# Patient Record
Sex: MALE | Race: WHITE | Employment: OTHER | URBAN - METROPOLITAN AREA
[De-identification: names, ages, dates, MRNs, and addresses within clinical notes are randomized per-mention and may not be internally consistent; named-entity substitution may affect disease eponyms.]

---

## 2017-01-06 ENCOUNTER — OFFICE VISIT (OUTPATIENT)
Dept: ORTHOPEDICS CLINIC | Facility: CLINIC | Age: 67
End: 2017-01-06

## 2017-01-06 ENCOUNTER — HOSPITAL ENCOUNTER (OUTPATIENT)
Dept: GENERAL RADIOLOGY | Facility: HOSPITAL | Age: 67
Discharge: HOME | End: 2017-01-06
Attending: ORTHOPAEDIC SURGERY | Admitting: ORTHOPAEDIC SURGERY
Payer: MEDICARE

## 2017-01-06 DIAGNOSIS — M25.521 RIGHT ELBOW PAIN: ICD-10-CM

## 2017-01-06 DIAGNOSIS — M77.01 MEDIAL EPICONDYLITIS, RIGHT: Primary | ICD-10-CM

## 2017-01-06 PROCEDURE — 99203 OFFICE O/P NEW LOW 30 MIN: CPT | Performed by: ORTHOPAEDIC SURGERY

## 2017-01-06 PROCEDURE — G0463 HOSPITAL OUTPT CLINIC VISIT: HCPCS | Performed by: ORTHOPAEDIC SURGERY

## 2017-01-06 PROCEDURE — 73080 X-RAY EXAM OF ELBOW: CPT

## 2017-01-06 NOTE — PROGRESS NOTES
1/6/2017  Coby Austin  19/1950  74year old   male  Lilliana Masters MD    HPI:   Patient presents with:  Consult: Right elbow and forearm pain -- Onset 09/2017 from pulling some wires while doing electrical. Pt plays tennis.   Denies ibeth Past Surgical History    COLONOSCOPY  2011    TONSILLECTOMY        Family History   Problem Relation Age of Onset   • Cancer Mother 80     abdominal cancer   • Heart Disorder Mother    • Substance Abuse Son         Smoking Status: Never Smoker repeat evaluation. Patient was told to let pain be his guide in terms of performing activities in the future.   The patient was told that if he continues to have significant pain after the next visit, he would likely benefit from a lidocaine injection into

## 2017-01-13 ENCOUNTER — OFFICE VISIT (OUTPATIENT)
Dept: OCCUPATIONAL MEDICINE | Facility: HOSPITAL | Age: 67
End: 2017-01-13
Attending: ORTHOPAEDIC SURGERY
Payer: MEDICARE

## 2017-01-13 DIAGNOSIS — M77.01 MEDIAL EPICONDYLITIS, RIGHT: Primary | ICD-10-CM

## 2017-01-13 PROCEDURE — 97530 THERAPEUTIC ACTIVITIES: CPT | Performed by: OCCUPATIONAL THERAPIST

## 2017-01-13 PROCEDURE — 97165 OT EVAL LOW COMPLEX 30 MIN: CPT | Performed by: OCCUPATIONAL THERAPIST

## 2017-01-13 NOTE — PROGRESS NOTES
OCCUPATIONAL THERAPY UPPER EXTREMITY EVALUATION:   Referring Physician: Dr. Brea Francis  Date of onset: August,2016  Right medial epicondyltiits Date of Service: 1/13/2017       PATIENT SUMMARY:   Elva Eastman is a 74year old y/o male who presents medial epicondyle and improve strength for IADL's. Precautions: None        OBJECTIVE:   OBSERVATION: Patient was seen for initial evaluation, moist heat, AROM, STM and HEP instruction. See below for objective measurements.     ORTHOTICS: forearm counter None  Rehab Potential:excellent     FOTO: 69/100  Current status G Code: Initial Carrying, Moving and Handling Objects CJ: 20-39% impaired, limited, or restricted  Goal status G Code: Carrying, Moving and Handling Objects CI: 1%-19% impaired, limited, or r

## 2017-01-16 ENCOUNTER — OFFICE VISIT (OUTPATIENT)
Dept: OCCUPATIONAL MEDICINE | Facility: HOSPITAL | Age: 67
End: 2017-01-16
Attending: ORTHOPAEDIC SURGERY
Payer: MEDICARE

## 2017-01-16 DIAGNOSIS — M77.01 MEDIAL EPICONDYLITIS, RIGHT: Primary | ICD-10-CM

## 2017-01-16 PROCEDURE — 97140 MANUAL THERAPY 1/> REGIONS: CPT | Performed by: OCCUPATIONAL THERAPIST

## 2017-01-16 PROCEDURE — 97110 THERAPEUTIC EXERCISES: CPT | Performed by: OCCUPATIONAL THERAPIST

## 2017-01-16 PROCEDURE — 97035 APP MDLTY 1+ULTRASOUND EA 15: CPT | Performed by: OCCUPATIONAL THERAPIST

## 2017-01-16 NOTE — PROGRESS NOTES
OAuthorized # of Visits:  8        Next MD visit: none scheduled  Fall Risk: standard         Precautions: n/a           Medication Changes since last visit?: No  Subjective: \"My arm is feeling better, the real test will be when I play tennis. \"    Ob

## 2017-01-20 ENCOUNTER — OFFICE VISIT (OUTPATIENT)
Dept: OCCUPATIONAL MEDICINE | Facility: HOSPITAL | Age: 67
End: 2017-01-20
Attending: ORTHOPAEDIC SURGERY
Payer: MEDICARE

## 2017-01-20 DIAGNOSIS — M77.01 MEDIAL EPICONDYLITIS, RIGHT: Primary | ICD-10-CM

## 2017-01-20 PROCEDURE — 97110 THERAPEUTIC EXERCISES: CPT | Performed by: OCCUPATIONAL THERAPIST

## 2017-01-20 PROCEDURE — 97140 MANUAL THERAPY 1/> REGIONS: CPT | Performed by: OCCUPATIONAL THERAPIST

## 2017-01-20 NOTE — PROGRESS NOTES
OAuthorized # of Visits:  8        Next MD visit: none scheduled  Fall Risk: standard         Precautions: n/a           Medication Changes since last visit?: No  Subjective:  Patient reports pain in right medial epicondyle after second day of tennis. 50 min

## 2017-01-23 ENCOUNTER — OFFICE VISIT (OUTPATIENT)
Dept: OCCUPATIONAL MEDICINE | Facility: HOSPITAL | Age: 67
End: 2017-01-23
Attending: ORTHOPAEDIC SURGERY
Payer: MEDICARE

## 2017-01-23 DIAGNOSIS — M77.01 MEDIAL EPICONDYLITIS, RIGHT: Primary | ICD-10-CM

## 2017-01-23 PROCEDURE — 97110 THERAPEUTIC EXERCISES: CPT | Performed by: OCCUPATIONAL THERAPIST

## 2017-01-23 PROCEDURE — 97140 MANUAL THERAPY 1/> REGIONS: CPT | Performed by: OCCUPATIONAL THERAPIST

## 2017-01-23 NOTE — PROGRESS NOTES
OAuthorized # of Visits:  8        Next MD visit: none scheduled  Fall Risk: standard         Precautions: n/a           Medication Changes since last visit?: No  Subjective:  Patient reports taking less medication (ibuprofin) past few days with same d maintain progress achieved in OT. Patient will demonstrate improved stress right  strength to 80 lbs for ease in opening jars. Patient will demonstrate test negative for provocative test: resisted forearm pronation.       Plan: Continue to work toward

## 2017-01-25 ENCOUNTER — OFFICE VISIT (OUTPATIENT)
Dept: OCCUPATIONAL MEDICINE | Facility: HOSPITAL | Age: 67
End: 2017-01-25
Attending: ORTHOPAEDIC SURGERY
Payer: MEDICARE

## 2017-01-25 DIAGNOSIS — M77.01 MEDIAL EPICONDYLITIS, RIGHT: Primary | ICD-10-CM

## 2017-01-25 PROCEDURE — 97140 MANUAL THERAPY 1/> REGIONS: CPT | Performed by: OCCUPATIONAL THERAPIST

## 2017-01-25 PROCEDURE — 97110 THERAPEUTIC EXERCISES: CPT | Performed by: OCCUPATIONAL THERAPIST

## 2017-01-25 NOTE — PROGRESS NOTES
OAuthorized # of Visits:  8        Next MD visit: none scheduled  Fall Risk: standard         Precautions: n/a           Medication Changes since last visit?: No  Subjective:  \" I played tennis yesterday and after about 1.5 hrs the pain became 3-4/10, shoulder flex/extension exercises         Body blade, UE resistive ex, with elbow flex and then with elbow extended 1 min each          Assessment: Upgraded resistive exercises today with minimal discomfort reported.        Goals:    Pt will present with im

## 2017-01-30 ENCOUNTER — OFFICE VISIT (OUTPATIENT)
Dept: OCCUPATIONAL MEDICINE | Facility: HOSPITAL | Age: 67
End: 2017-01-30
Attending: ORTHOPAEDIC SURGERY
Payer: MEDICARE

## 2017-01-30 DIAGNOSIS — M77.01 MEDIAL EPICONDYLITIS, RIGHT: Primary | ICD-10-CM

## 2017-01-30 PROCEDURE — 97140 MANUAL THERAPY 1/> REGIONS: CPT | Performed by: OCCUPATIONAL THERAPIST

## 2017-01-30 PROCEDURE — 97110 THERAPEUTIC EXERCISES: CPT | Performed by: OCCUPATIONAL THERAPIST

## 2017-01-30 NOTE — PROGRESS NOTES
OAuthorized # of Visits:  8        Next MD visit: none scheduled  Fall Risk: standard         Precautions: n/a           Medication Changes since last visit?: No  Subjective:  \" I played tennis last week and the pain came back after about 1 hour of pl pulsed ultrs 3.0 MHZ ,1.2 cm2, 8 min  Power  55#, 50 pegs Power  75#, 50 pegs Closed chain exercise, rebounder with 2# med ball Closed chain exercise, rebounder with 4# med ball x 40       Red T band PRE wrist flex/ext, forearm supine, shoulder fle

## 2017-02-01 ENCOUNTER — OFFICE VISIT (OUTPATIENT)
Dept: OCCUPATIONAL MEDICINE | Facility: HOSPITAL | Age: 67
End: 2017-02-01
Attending: ORTHOPAEDIC SURGERY
Payer: MEDICARE

## 2017-02-01 DIAGNOSIS — M77.01 MEDIAL EPICONDYLITIS, RIGHT: Primary | ICD-10-CM

## 2017-02-01 PROCEDURE — 97110 THERAPEUTIC EXERCISES: CPT | Performed by: OCCUPATIONAL THERAPIST

## 2017-02-01 PROCEDURE — 97140 MANUAL THERAPY 1/> REGIONS: CPT | Performed by: OCCUPATIONAL THERAPIST

## 2017-02-01 NOTE — PROGRESS NOTES
OAuthorized # of Visits:  12 (per MD order)     Next MD visit: none scheduled  Fall Risk: standard         Precautions: n/a           Medication Changes since last visit?: No  Subjective:  \" The pain in my right arm increased more quickly while playin Green putty, , twist and pull, pinch, 5 min Green putty, , twist and pull, pinch, 5 min Blue putty, FDS/ FDP , twist and pull Blue putty, FDS/ FDP , twist and pull Blue putty, FDS/ FDP , twist and pull    20% pulsed ultrs 3.0 MHZ ,1.

## 2017-02-06 ENCOUNTER — OFFICE VISIT (OUTPATIENT)
Dept: OCCUPATIONAL MEDICINE | Facility: HOSPITAL | Age: 67
End: 2017-02-06
Attending: ORTHOPAEDIC SURGERY
Payer: MEDICARE

## 2017-02-06 DIAGNOSIS — M77.01 MEDIAL EPICONDYLITIS, RIGHT: Primary | ICD-10-CM

## 2017-02-06 PROCEDURE — 97140 MANUAL THERAPY 1/> REGIONS: CPT | Performed by: OCCUPATIONAL THERAPIST

## 2017-02-06 PROCEDURE — 97110 THERAPEUTIC EXERCISES: CPT | Performed by: OCCUPATIONAL THERAPIST

## 2017-02-06 NOTE — PROGRESS NOTES
OAuthorized # of Visits:  12 (per MD order)     Next MD visit: none scheduled  Fall Risk: standard         Precautions: n/a           Medication Changes since last visit?: No  Subjective:  \"I've been having a little pain, especially when I need to twi with 4 # , x 20,        PRE wrist flex/ext with 3 wts x 20, 2 sets PRE wrist flex/ext with 3 # x 20, 2 sets PRE wrist flex/ext with 3 # x 25, 2 sets PRE wrist flex/ext, UD/RD with 5# x15, 2 sets PRE wrist flex/ext, UD/RD with 5# x15, 2 sets PRE wrist flex/ afshan.  Patient will demonstrate test negative for provocative test: resisted forearm pronation. ...(made some progress, less painful)      Plan: Continue to work toward pain management and increasing strength in right UE.  Reevaluate next session  Charges: SHANIQUA

## 2017-02-10 ENCOUNTER — OFFICE VISIT (OUTPATIENT)
Dept: OCCUPATIONAL MEDICINE | Facility: HOSPITAL | Age: 67
End: 2017-02-10
Attending: ORTHOPAEDIC SURGERY
Payer: MEDICARE

## 2017-02-10 PROCEDURE — 97035 APP MDLTY 1+ULTRASOUND EA 15: CPT | Performed by: OCCUPATIONAL THERAPIST

## 2017-02-10 PROCEDURE — 97110 THERAPEUTIC EXERCISES: CPT | Performed by: OCCUPATIONAL THERAPIST

## 2017-02-10 PROCEDURE — 97140 MANUAL THERAPY 1/> REGIONS: CPT | Performed by: OCCUPATIONAL THERAPIST

## 2017-02-10 NOTE — PROGRESS NOTES
OAuthorized # of Visits:  12 (per MD order)     Next MD visit: none scheduled  Fall Risk: standard         Precautions: n/a           Medication Changes since last visit?: No  Subjective:  \" The elbow pain increased after two days of tennis, 4-5/10 pa pronation/supination with 4 # , x 20, Eccentric forearm pronation with 3 wts x 20       PRE wrist flex/ext with 3 wts x 20, 2 sets PRE wrist flex/ext with 3 # x 20, 2 sets PRE wrist flex/ext with 3 # x 25, 2 sets PRE wrist flex/ext, UD/RD with 5# x15, 2 se Severity modifier to: CI  Pt complaints of pain in right medial epicondyle  will decrease at worst to 1/10 when serving in tennis. ...(made progress toward)  Pt will be independent and compliant with comprehensive HEP to maintain progress achieved in OT. Slater August Slater August Slater August

## 2017-02-15 ENCOUNTER — APPOINTMENT (OUTPATIENT)
Dept: OCCUPATIONAL MEDICINE | Facility: HOSPITAL | Age: 67
End: 2017-02-15
Attending: ORTHOPAEDIC SURGERY
Payer: MEDICARE

## 2017-02-17 ENCOUNTER — OFFICE VISIT (OUTPATIENT)
Dept: OCCUPATIONAL MEDICINE | Facility: HOSPITAL | Age: 67
End: 2017-02-17
Attending: ORTHOPAEDIC SURGERY
Payer: MEDICARE

## 2017-02-17 PROCEDURE — 97110 THERAPEUTIC EXERCISES: CPT | Performed by: OCCUPATIONAL THERAPIST

## 2017-02-17 PROCEDURE — 97140 MANUAL THERAPY 1/> REGIONS: CPT | Performed by: OCCUPATIONAL THERAPIST

## 2017-02-17 NOTE — PROGRESS NOTES
OAuthorized # of Visits:  12 (per MD order)     Next MD visit: none scheduled  Fall Risk: standard         Precautions: n/a           Medication Changes since last visit?: No  Subjective:  \" The pain persists on and off even though I haven't been play 25 PRE forearm pronation/supination with 3 # wt 25 PRE forearm pronation/supination with 3 # , x 20, 2 sets Eccentric forearm pronation exercises with 3 wts  PRE forearm pronation/supination with 4 # , x 20, Eccentric forearm pronation with 3 wts x 20 Ecce Body blade, UE resistive ex, with elbow flex and then with elbow extended 1 min each Power  75#, 50 pegs Power  100# 50 pegs Power  100# 50 pegs 20% pulsed ultrs 3.0 MHZ ,1.2 cm2, 8 min Cont ultrasound 3.0 MHZ, 1.2 w/cm2 8 min at [facility]

## 2017-03-01 ENCOUNTER — OFFICE VISIT (OUTPATIENT)
Dept: OCCUPATIONAL MEDICINE | Facility: HOSPITAL | Age: 67
End: 2017-03-01
Attending: ORTHOPAEDIC SURGERY
Payer: MEDICARE

## 2017-03-01 PROCEDURE — 97140 MANUAL THERAPY 1/> REGIONS: CPT | Performed by: OCCUPATIONAL THERAPIST

## 2017-03-01 PROCEDURE — 97110 THERAPEUTIC EXERCISES: CPT | Performed by: OCCUPATIONAL THERAPIST

## 2017-03-01 NOTE — PROGRESS NOTES
OAuthorized # of Visits:  12 (per MD order)     Next MD visit: none scheduled  Fall Risk: standard         Precautions: n/a           Medication Changes since last visit?: No  Subjective:  \"I took off from tennis for 2 weeks then just played yesterday PRE forearm pronation/supination with 2 # wt 20,  2sets PRE forearm pronation/supination with 3 # wt 25 PRE forearm pronation/supination with 3 # wt 25 PRE forearm pronation/supination with 3 # , x 20, 2 sets Eccentric forearm pronation exercises with 3 wt Green T band shoulder flex/extension exercises x20, 2 sets Green T band shoulder flex/extension exercises x20, 2 sets Green T band shoulder flex/extension exercises x20, 2 Green T band shoulder flex/extension exercises x20, 2 Green T band shoulder flex/ext

## 2017-03-02 ENCOUNTER — APPOINTMENT (OUTPATIENT)
Dept: CT IMAGING | Facility: HOSPITAL | Age: 67
End: 2017-03-02
Attending: NURSE PRACTITIONER
Payer: MEDICARE

## 2017-03-02 ENCOUNTER — HOSPITAL ENCOUNTER (EMERGENCY)
Facility: HOSPITAL | Age: 67
Discharge: HOME | End: 2017-03-02
Payer: MEDICARE

## 2017-03-02 VITALS
BODY MASS INDEX: 31.84 KG/M2 | HEART RATE: 78 BPM | OXYGEN SATURATION: 99 % | DIASTOLIC BLOOD PRESSURE: 79 MMHG | TEMPERATURE: 98 F | WEIGHT: 215 LBS | RESPIRATION RATE: 14 BRPM | SYSTOLIC BLOOD PRESSURE: 152 MMHG | HEIGHT: 69 IN

## 2017-03-02 DIAGNOSIS — S01.01XA SCALP LACERATION, INITIAL ENCOUNTER: Primary | ICD-10-CM

## 2017-03-02 DIAGNOSIS — S06.0X0A MILD CONCUSSION, WITHOUT LOSS OF CONSCIOUSNESS, INITIAL ENCOUNTER: ICD-10-CM

## 2017-03-02 PROCEDURE — 12001 RPR S/N/AX/GEN/TRNK 2.5CM/<: CPT

## 2017-03-02 PROCEDURE — 99284 EMERGENCY DEPT VISIT MOD MDM: CPT

## 2017-03-02 PROCEDURE — 70450 CT HEAD/BRAIN W/O DYE: CPT

## 2017-03-02 RX ORDER — TRAMADOL HYDROCHLORIDE 50 MG/1
50 TABLET ORAL EVERY 6 HOURS PRN
Qty: 15 TABLET | Refills: 0 | Status: SHIPPED | OUTPATIENT
Start: 2017-03-02 | End: 2017-03-06

## 2017-03-02 NOTE — ED INITIAL ASSESSMENT (HPI)
Pt states he fell backwards hitting his head on the ground while playing tennis about 2 hours PTA. Pt denies loss of consciousness, nausea, blurring of vision or weakness. Pt is not on blood-thinners. He sustained a laceration at the back of his head.

## 2017-03-03 NOTE — ED NOTES
Patient discharged home in no acute distress. A&Ox4, skin p/w/d, denies cp/sob. Ambulating with steady gait and verbalized understanding of d/c instructions and prescriptions. Dry dressing applied to cover wound prior to d/c.

## 2017-03-03 NOTE — ED PROVIDER NOTES
Patient Seen in: [facility] Emergency Department    History   CC: head injury  HPI: Alis Sandhu 74year old male  who presents to the ER c/o head injury today approximately 2 hours prior to coming to the emergency department.   States he discontinued   LORATADINE ALLERGY RELIEF OR,  Take  by mouth. Multiple Vitamin (MULTI-VITAMINS) Oral Tab,  Take 1 tablet by mouth daily. Ginkgo Biloba 100 MG Oral Cap,  Take 1 capsule by mouth daily.    predniSONE 10 MG Oral Tab,  4 a day for 3 days 3 a finger to nose tests,  MSK - makes purposeful movements of all extremities, hand strength equal bilat, radial pulses 2+ bilat.   Psych - Interactive and appropriate      ED Course   Labs Reviewed - No data to display    MDM       Procedure: LACERATION REPAI

## 2017-03-03 NOTE — ED NOTES
This RN called to bedside by ED registrar stating patient \"feeling faint\". Patient sitting up on cart, states he was feeling slightly dizzy and like he may have a syncopal episode. Patient lied down flat on cart.  Lights turned down at his request. NP [facility]

## 2017-03-08 ENCOUNTER — OFFICE VISIT (OUTPATIENT)
Dept: OCCUPATIONAL MEDICINE | Facility: HOSPITAL | Age: 67
End: 2017-03-08
Attending: ORTHOPAEDIC SURGERY
Payer: MEDICARE

## 2017-03-08 PROCEDURE — 97140 MANUAL THERAPY 1/> REGIONS: CPT | Performed by: OCCUPATIONAL THERAPIST

## 2017-03-08 PROCEDURE — 97110 THERAPEUTIC EXERCISES: CPT | Performed by: OCCUPATIONAL THERAPIST

## 2017-03-08 NOTE — PROGRESS NOTES
Patient Name: Luisito Chakraborty, : 3/9/1950, MRN: [id number]   Date:  3/8/2017  Referring Physician:  Senthil Giles    Diagnosis: right medial epicondylitis      Discharge Summary OT    Pt has attended 12   , cancelled 1, and no shown 0 visi Severity modifier to: HANNAH Gamble .(Achieved)  Pt complaints of pain in right medial epicondyle  will decrease at worst to 1/10 when serving in tennis. ...(made progress toward)  Pt will be independent and compliant with comprehensive HEP to maintain progress achie

## 2017-03-10 ENCOUNTER — OFFICE VISIT (OUTPATIENT)
Dept: INTERNAL MEDICINE CLINIC | Facility: CLINIC | Age: 67
End: 2017-03-10

## 2017-03-10 VITALS
DIASTOLIC BLOOD PRESSURE: 85 MMHG | HEIGHT: 69 IN | SYSTOLIC BLOOD PRESSURE: 138 MMHG | BODY MASS INDEX: 32.88 KG/M2 | HEART RATE: 72 BPM | WEIGHT: 222 LBS | TEMPERATURE: 99 F

## 2017-03-10 DIAGNOSIS — Z48.02 VISIT FOR SUTURE REMOVAL: Primary | ICD-10-CM

## 2017-03-10 PROCEDURE — G0463 HOSPITAL OUTPT CLINIC VISIT: HCPCS | Performed by: INTERNAL MEDICINE

## 2017-03-10 PROCEDURE — 99213 OFFICE O/P EST LOW 20 MIN: CPT | Performed by: INTERNAL MEDICINE

## 2017-03-10 NOTE — PROGRESS NOTES
7 sutures removed from scalp pt salvador well  Blood pressure 138/85, pulse 72, temperature 98.5 °F (36.9 °C), temperature source Oral, height 5' 9\" (1.753 m), weight 222 lb (100.699 kg).

## 2018-06-30 ENCOUNTER — TELEPHONE (OUTPATIENT)
Dept: INTERNAL MEDICINE CLINIC | Facility: CLINIC | Age: 68
End: 2018-06-30

## 2018-06-30 NOTE — TELEPHONE ENCOUNTER
Pt scheduled appt via ERPLY with the following message:  7/3/2018     8:50 AM  20 mins. Mary Ann Gonzalez MD        [facility]      Patient Comments:   New Problem / [address] Visit   For last week plus, I've had soreness/swelling in my    right foot.

## 2018-06-30 NOTE — TELEPHONE ENCOUNTER
Patient calling - verified patient's name and  - pt denies injury, discoloration, tingling, bruising, chest pain SOB, dizziness. Pt states the swelling is \"moderate\" and he can still wear his shoe.  Pt states he feels that the pain and swelling are imp

## 2018-07-03 ENCOUNTER — OFFICE VISIT (OUTPATIENT)
Dept: INTERNAL MEDICINE CLINIC | Facility: CLINIC | Age: 68
End: 2018-07-03

## 2018-07-03 VITALS
BODY MASS INDEX: 32.13 KG/M2 | DIASTOLIC BLOOD PRESSURE: 82 MMHG | HEART RATE: 83 BPM | WEIGHT: 212 LBS | HEIGHT: 68 IN | SYSTOLIC BLOOD PRESSURE: 146 MMHG

## 2018-07-03 DIAGNOSIS — R03.0 ELEVATED BLOOD PRESSURE READING: ICD-10-CM

## 2018-07-03 DIAGNOSIS — M79.671 RIGHT FOOT PAIN: Primary | ICD-10-CM

## 2018-07-03 PROCEDURE — G0463 HOSPITAL OUTPT CLINIC VISIT: HCPCS | Performed by: INTERNAL MEDICINE

## 2018-07-03 PROCEDURE — 99213 OFFICE O/P EST LOW 20 MIN: CPT | Performed by: INTERNAL MEDICINE

## 2018-07-03 NOTE — PATIENT INSTRUCTIONS
Please call if your right foot pain does not continue to go away. You may stop ibuprofen soon. Please limit dietary sodium and increase dietary potassium. Return visit in 1 month for a blood pressure check.

## 2018-07-03 NOTE — PROGRESS NOTES
Mikael Cordova is a 77year old male. Patient presents with:  Swelling: C/o right foot swelling, and toe soreness.   Stts he's taken 800 mg ibuprofen twice a day with improvement    HPI:   About 2 weeks ago, he developed swelling and \"soreness\" on Depression with anxiety 1990s   • Seasonal allergies      Past Surgical History:  2011: COLONOSCOPY  Childhood: TONSILLECTOMY   Social History:  Smoking status: Never Smoker                                                              Smokeless tobacco: Ne

## 2018-07-30 ENCOUNTER — OFFICE VISIT (OUTPATIENT)
Dept: INTERNAL MEDICINE CLINIC | Facility: CLINIC | Age: 68
End: 2018-07-30
Payer: MEDICARE

## 2018-07-30 VITALS
BODY MASS INDEX: 32.43 KG/M2 | DIASTOLIC BLOOD PRESSURE: 64 MMHG | SYSTOLIC BLOOD PRESSURE: 140 MMHG | RESPIRATION RATE: 18 BRPM | WEIGHT: 214 LBS | HEART RATE: 78 BPM | HEIGHT: 68 IN

## 2018-07-30 DIAGNOSIS — R03.0 ELEVATED BLOOD PRESSURE READING: ICD-10-CM

## 2018-07-30 DIAGNOSIS — M79.672 LEFT FOOT PAIN: Primary | ICD-10-CM

## 2018-07-30 PROCEDURE — 99213 OFFICE O/P EST LOW 20 MIN: CPT | Performed by: INTERNAL MEDICINE

## 2018-07-30 PROCEDURE — G0463 HOSPITAL OUTPT CLINIC VISIT: HCPCS | Performed by: INTERNAL MEDICINE

## 2018-07-30 NOTE — PATIENT INSTRUCTIONS
Please rest and elevate your left foot, and apply ice and take ibuprofen as needed. Call if no better.

## 2018-07-30 NOTE — PROGRESS NOTES
Yanna Tsai is a 77year old male. Patient presents with:  Swelling: left foot, [id number]    HPI:   Mr. Tyrell Cooper presents this afternoon, accompanied by his wife, for evaluation.     For approximately 1-1/2 weeks, he has had pain and swelling affecti Mild poorly localizing tenderness dorsal aspect left foot without palpable abnormality.   He is able to fully bear weight and walk without favoring his left foot  PULSES: 2+ bilateral left dorsalis pedis and left posterior tibial      ASSESSMENT AND PLAN:

## 2018-10-09 ENCOUNTER — PATIENT MESSAGE (OUTPATIENT)
Dept: INTERNAL MEDICINE CLINIC | Facility: CLINIC | Age: 68
End: 2018-10-09

## 2018-10-10 NOTE — TELEPHONE ENCOUNTER
From: Ben Florian  To: Reese Jeffrey MD  Sent: 10/9/2018 4:27 PM CDT  Subject: Other    Immunization update--Fluzone High-Dose 2018-19 SYR via Medicare 10/09/18

## 2021-03-15 DIAGNOSIS — Z23 NEED FOR VACCINATION: ICD-10-CM

## (undated) NOTE — ED AVS SNAPSHOT
[facility] Emergency Department    [address]     [address] [place] [address]    Phone:  [phone]    Fax:  Brian Lopez   MRN: [id number]    Department:  [facility] Emergency Department   Date of Visit and Class Registration line at [phone] or find a doctor online by visiting [email or web address].    IF THERE IS ANY CHANGE OR WORSENING OF YOUR CONDITION, CALL YOUR PRIMARY CARE PHYSICIAN AT ONCE OR RETURN IMMEDIATELY TO [address] [place] [address].     If

## (undated) NOTE — MR AVS SNAPSHOT
Robb Malone [address]  [phone]  [phone]               Thank you for choosing us for your health care visit with Omar Victor OT.   We are glad to serve you and happy to provide you with

## (undated) NOTE — MR AVS SNAPSHOT
Robb Malone [address]  [phone]  [phone]               Thank you for choosing us for your health care visit with Haley Royal OT.   We are glad to serve you and happy to provide you with

## (undated) NOTE — MR AVS SNAPSHOT
Robb Malone [address]  [phone]  [phone]               Thank you for choosing us for your health care visit with Zack Zamora OT.   We are glad to serve you and happy to provide you with Summaries. If you've been to the Emergency Department or your doctor's office, you can view your past visit information in Zen Planner by going to Visits < Visit Summaries. Zen Planner questions? Call [phone] for help.   [employer]en [employer]anner is NOT to be used for urge

## (undated) NOTE — MR AVS SNAPSHOT
Robb Malone [address]  [phone]  [phone]               Thank you for choosing us for your health care visit with Roly Pacheco OT.   We are glad to serve you and happy to provide you with You can access your MyChart to more actively manage your health care and view more details from this visit by going to [email or web address].   If you've recently had a stay at the Hospital you can access your discharge instructions in [address] by sxito

## (undated) NOTE — MR AVS SNAPSHOT
Bernardo  [address]  [phone]               Thank you for choosing us for your health care visit with Tristan Schmidt MD.  We are glad to serve you and happy to provide you with this rowell medications prescribed for you. Read the directions carefully, and ask your doctor or other care provider to review them with you.             Today's Orders     XR ELBOW, COMPLETE (MIN 3 VIEWS), RIGHT (CPT=73080)    Complete by:  Jan 06, 2017 (Approximate) If you are confident that your benefit plan will not require a referral or authorization, such as [place] Medicaid, please feel free to schedule your appointment immediately.  However, if you are unsure about the requirements for authorization, please wait

## (undated) NOTE — MR AVS SNAPSHOT
Robb Malone [address]  [phone]  [phone]               Thank you for choosing us for your health care visit with Fantasma Chamberlain OT.   We are glad to serve you and happy to provide you with

## (undated) NOTE — MR AVS SNAPSHOT
Robb Malone [address]  [phone]  [phone]               Thank you for choosing us for your health care visit with Jatinder Neri OT.   We are glad to serve you and happy to provide you with You can access your MyChart to more actively manage your health care and view more details from this visit by going to [email or web address].   If you've recently had a stay at the Hospital you can access your discharge instructions in [address] by sixto

## (undated) NOTE — MR AVS SNAPSHOT
Robb Malone [address]  [phone]  [phone]               Thank you for choosing us for your health care visit with Amy Ewing OT.   We are glad to serve you and happy to provide you with Summaries. If you've been to the Emergency Department or your doctor's office, you can view your past visit information in Fandeavor by going to Visits < Visit Summaries. Fandeavor questions? Call [phone] for help.   [employer]avor is NOT to be used for urge

## (undated) NOTE — MR AVS SNAPSHOT
Robb Malone [address]  [phone]  [phone]               Thank you for choosing us for your health care visit with Jatinder Neri OT.   We are glad to serve you and happy to provide you with Please arrive at your scheduled therapy time. Feb 01, 2017 12:00 PM   [place] [facility] Visit By Therapist with Norberto Cha, 55 [facility] ([facility])    [address]  Haroon Rodgers office, you can view your past visit information in MentiNovaharSynchrony by going to Visits < Visit Summaries. Cellfire questions? Call [phone] for help. Cellfire is NOT to be used for urgent needs. For medical emergencies, dial 911.            Visit EDWARD-EL

## (undated) NOTE — MR AVS SNAPSHOT
Robb Malone [address]  [phone]  [phone]               Thank you for choosing us for your health care visit with Tim Mtz OT.   We are glad to serve you and happy to provide you with

## (undated) NOTE — MR AVS SNAPSHOT
Robb Malone [address]  [phone]  [phone]               Thank you for choosing us for your health care visit with Pete Tierney OT.   We are glad to serve you and happy to provide you with [place] [facility] Visit By Therapist with Abelina Blizzard, 55 [facility] ([facility])    [address]   [phone]           Please arrive at your schedul

## (undated) NOTE — MR AVS SNAPSHOT
[facility] SPECIALTY [facility] [place] [address]  [phone]  [phone]               Thank you for choosing us for your health care visit with Brian Reyna MD.  We are glad to serve you and happy to provide you with this summary o Summaries. If you've been to the Emergency Department or your doctor's office, you can view your past visit information in Silicon Hive by going to Visits < Visit Summaries. Silicon Hive questions? Call [phone] for help.   Sil[employer] Hive is NOT to be used for urge

## (undated) NOTE — ED AVS SNAPSHOT
[facility] Emergency Department    [place] [address]    Phone:  [phone]    Fax:  Brian Lopez   MRN: [id number]    Department:  [facility] Emergency Department   Date of Visit Make an apt to have your sutures/staples removed in 6-7 days. You may take the tramadol every 6 hours as needed for be aware this medication causes drowsiness he should not take it before driving, using with alcohol, or before making important decisions. to serve you. You are our top priority. You were examined and treated today on an urgent basis only. This was not a substitute for ongoing medical care. Often, one Emergency Department visit does not uncover every injury or illness.  If you have been r [facility] Pharm[facility]        Pharmacy Address Phone Number   Vincent Hassan [address] [facility] ([address]) [facility] (Sharan Rudd 11) [facility]  [facility] [phone] Call [phone] for help. VISuphart is NOT to be used for urgent needs. For medical emergencies, dial 911.

## (undated) NOTE — MR AVS SNAPSHOT
Robb Malone [address]  [phone]  [phone]               Thank you for choosing us for your health care visit with Steph Lynch OT.   We are glad to serve you and happy to provide you with

## (undated) NOTE — MR AVS SNAPSHOT
Robb Malone 12 2000 [facility] [address]  [phone]  [phone]               Thank you for choosing us for your health care visit with Katey Blackmon OT.   We are glad to serve you and happy to provide you with Please arrive at your scheduled therapy time. Jan 30, 2017 11:15 AM   [place] Occupational Therapy Visit By Therapist with Benito Sender, 55 [facility] ([facility])    [address]  Karla Campbell office, you can view your past visit information in SeeVolution by going to Visits < Visit Summaries. SeeVolution questions? Call [phone] for help. SeeVolution is NOT to be used for urgent needs. For medical emergencies, dial 911.            Visit EDWARD-EL